# Patient Record
Sex: FEMALE | Race: WHITE | NOT HISPANIC OR LATINO | Employment: OTHER
[De-identification: names, ages, dates, MRNs, and addresses within clinical notes are randomized per-mention and may not be internally consistent; named-entity substitution may affect disease eponyms.]

---

## 2020-06-23 ENCOUNTER — OFFICE VISIT (OUTPATIENT)
Age: 70
End: 2020-06-23

## 2020-06-24 ENCOUNTER — TELEPHONE ENCOUNTER (OUTPATIENT)
Dept: URBAN - METROPOLITAN AREA CLINIC 9 | Facility: CLINIC | Age: 70
End: 2020-06-24

## 2020-07-06 ENCOUNTER — TELEPHONE ENCOUNTER (OUTPATIENT)
Dept: URBAN - METROPOLITAN AREA CLINIC 9 | Facility: CLINIC | Age: 70
End: 2020-07-06

## 2020-07-09 ENCOUNTER — OFFICE VISIT (OUTPATIENT)
Dept: URBAN - METROPOLITAN AREA CLINIC 7 | Facility: CLINIC | Age: 70
End: 2020-07-09

## 2020-07-16 ENCOUNTER — OFFICE VISIT (OUTPATIENT)
Dept: URBAN - METROPOLITAN AREA CLINIC 7 | Facility: CLINIC | Age: 70
End: 2020-07-16

## 2020-07-16 ENCOUNTER — TELEPHONE ENCOUNTER (OUTPATIENT)
Dept: URBAN - METROPOLITAN AREA CLINIC 9 | Facility: CLINIC | Age: 70
End: 2020-07-16

## 2020-08-05 ENCOUNTER — OFFICE VISIT (OUTPATIENT)
Dept: URBAN - METROPOLITAN AREA CLINIC 7 | Facility: CLINIC | Age: 70
End: 2020-08-05

## 2020-09-18 ENCOUNTER — OFFICE VISIT (OUTPATIENT)
Dept: URBAN - METROPOLITAN AREA SURGERY CENTER 5 | Facility: SURGERY CENTER | Age: 70
End: 2020-09-18

## 2020-10-01 ENCOUNTER — OFFICE VISIT (OUTPATIENT)
Age: 70
End: 2020-10-01

## 2021-07-01 ENCOUNTER — OFFICE VISIT (OUTPATIENT)
Age: 71
End: 2021-07-01

## 2021-09-16 ENCOUNTER — OFFICE VISIT (OUTPATIENT)
Dept: URBAN - METROPOLITAN AREA CLINIC 7 | Facility: CLINIC | Age: 71
End: 2021-09-16

## 2021-09-24 LAB — CONCENTRATE (1): (no result)

## 2021-09-28 LAB
CLOSTRIDIUM DIFFICILE TOXIN/GDH W/REFL TO PCR: (no result)
CYCLOSPORA AND ISOSPORA EXAMINATION: (no result)
LACTOFERRIN, QL, STOOL: (no result)
OVA AND PARASITES, CONC AND PERM SMEAR: (no result)
PANCREATIC ELASTASE-1: (no result)

## 2021-10-04 ENCOUNTER — TELEPHONE ENCOUNTER (OUTPATIENT)
Dept: URBAN - METROPOLITAN AREA CLINIC 9 | Facility: CLINIC | Age: 71
End: 2021-10-04

## 2021-11-18 ENCOUNTER — TELEPHONE ENCOUNTER (OUTPATIENT)
Dept: URBAN - METROPOLITAN AREA CLINIC 9 | Facility: CLINIC | Age: 71
End: 2021-11-18

## 2021-12-23 ENCOUNTER — TELEPHONE ENCOUNTER (OUTPATIENT)
Dept: URBAN - METROPOLITAN AREA CLINIC 9 | Facility: CLINIC | Age: 71
End: 2021-12-23

## 2022-01-27 ENCOUNTER — TELEPHONE ENCOUNTER (OUTPATIENT)
Dept: URBAN - METROPOLITAN AREA CLINIC 9 | Facility: CLINIC | Age: 72
End: 2022-01-27

## 2022-01-31 ENCOUNTER — TELEPHONE ENCOUNTER (OUTPATIENT)
Dept: URBAN - METROPOLITAN AREA CLINIC 9 | Facility: CLINIC | Age: 72
End: 2022-01-31

## 2022-02-10 ENCOUNTER — OFFICE VISIT (OUTPATIENT)
Dept: URBAN - METROPOLITAN AREA SURGERY CENTER 5 | Facility: SURGERY CENTER | Age: 72
End: 2022-02-10

## 2022-02-22 ENCOUNTER — OFFICE VISIT (OUTPATIENT)
Dept: URBAN - METROPOLITAN AREA SURGERY CENTER 5 | Facility: SURGERY CENTER | Age: 72
End: 2022-02-22

## 2022-04-27 ENCOUNTER — OFFICE VISIT (OUTPATIENT)
Age: 72
End: 2022-04-27

## 2022-04-27 ENCOUNTER — TELEPHONE ENCOUNTER (OUTPATIENT)
Dept: URBAN - METROPOLITAN AREA CLINIC 9 | Facility: CLINIC | Age: 72
End: 2022-04-27

## 2022-05-23 ENCOUNTER — OFFICE VISIT (OUTPATIENT)
Dept: URBAN - METROPOLITAN AREA CLINIC 7 | Facility: CLINIC | Age: 72
End: 2022-05-23

## 2022-06-29 ENCOUNTER — OFFICE VISIT (OUTPATIENT)
Dept: URBAN - METROPOLITAN AREA SURGERY CENTER 5 | Facility: SURGERY CENTER | Age: 72
End: 2022-06-29

## 2022-07-07 ENCOUNTER — TELEPHONE ENCOUNTER (OUTPATIENT)
Dept: URBAN - METROPOLITAN AREA CLINIC 9 | Facility: CLINIC | Age: 72
End: 2022-07-07

## 2022-07-11 ENCOUNTER — TELEPHONE ENCOUNTER (OUTPATIENT)
Dept: URBAN - METROPOLITAN AREA CLINIC 9 | Facility: CLINIC | Age: 72
End: 2022-07-11

## 2022-07-13 ENCOUNTER — OFFICE VISIT (OUTPATIENT)
Dept: URBAN - METROPOLITAN AREA SURGERY CENTER 5 | Facility: SURGERY CENTER | Age: 72
End: 2022-07-13

## 2022-07-30 ENCOUNTER — TELEPHONE ENCOUNTER (OUTPATIENT)
Age: 72
End: 2022-07-30

## 2022-07-30 RX ORDER — SENNA-LAX 8.6 MG/1
1 (ONE) TABLET ORAL
Qty: 0 | Refills: 5 | OUTPATIENT
Start: 2015-09-25 | End: 2016-03-01

## 2022-07-30 RX ORDER — LORATADINE 5 MG
17 GRAMS TABLET,CHEWABLE ORAL
Qty: 0 | Refills: 16 | OUTPATIENT
Start: 2015-09-25 | End: 2016-03-01

## 2022-07-30 RX ORDER — FAMOTIDINE 10 MG
1 (ONE) TABLET ORAL QPM
Qty: 0 | Refills: 11 | OUTPATIENT
Start: 2019-06-17 | End: 2021-09-16

## 2022-07-31 ENCOUNTER — TELEPHONE ENCOUNTER (OUTPATIENT)
Age: 72
End: 2022-07-31

## 2022-07-31 RX ORDER — FAMOTIDINE 10 MG
1 (ONE) TABLET ORAL QPM
Qty: 0 | Refills: 11 | Status: ACTIVE | COMMUNITY
Start: 2018-06-19

## 2022-07-31 RX ORDER — SENNA-LAX 8.6 MG/1
1 (ONE) TABLET ORAL
Qty: 0 | Refills: 5 | Status: ACTIVE | COMMUNITY
Start: 2015-09-25

## 2022-07-31 RX ORDER — LORATADINE 5 MG
17 GRAMS TABLET,CHEWABLE ORAL
Qty: 0 | Refills: 16 | Status: ACTIVE | COMMUNITY
Start: 2015-09-25

## 2022-07-31 RX ORDER — FAMOTIDINE 10 MG
1 (ONE) TABLET ORAL QPM
Qty: 0 | Refills: 11 | Status: ACTIVE | COMMUNITY
Start: 2019-06-17

## 2022-09-20 ENCOUNTER — TELEPHONE ENCOUNTER (OUTPATIENT)
Dept: URBAN - METROPOLITAN AREA CLINIC 7 | Facility: CLINIC | Age: 72
End: 2022-09-20

## 2022-09-22 ENCOUNTER — CLAIMS CREATED FROM THE CLAIM WINDOW (OUTPATIENT)
Dept: URBAN - METROPOLITAN AREA CLINIC 8 | Facility: CLINIC | Age: 72
End: 2022-09-22
Payer: MEDICARE

## 2022-09-22 ENCOUNTER — TELEPHONE ENCOUNTER (OUTPATIENT)
Dept: URBAN - METROPOLITAN AREA CLINIC 7 | Facility: CLINIC | Age: 72
End: 2022-09-22

## 2022-09-22 ENCOUNTER — OFFICE VISIT (OUTPATIENT)
Dept: URBAN - METROPOLITAN AREA SURGERY CENTER 5 | Facility: SURGERY CENTER | Age: 72
End: 2022-09-22
Payer: MEDICARE

## 2022-09-22 DIAGNOSIS — R13.10 ABNORMAL DEGLUTITION: ICD-10-CM

## 2022-09-22 DIAGNOSIS — K21.00 ALKALINE REFLUX ESOPHAGITIS: ICD-10-CM

## 2022-09-22 DIAGNOSIS — K22.89 DILATATION OF ESOPHAGUS: ICD-10-CM

## 2022-09-22 DIAGNOSIS — Z87.19 ESOPHAGEAL FOOD BOLUS: ICD-10-CM

## 2022-09-22 PROCEDURE — 43248 EGD GUIDE WIRE INSERTION: CPT | Performed by: CLINIC/CENTER

## 2022-09-22 PROCEDURE — 88312 SPECIAL STAINS GROUP 1: CPT | Performed by: INTERNAL MEDICINE

## 2022-09-22 PROCEDURE — 43248 EGD GUIDE WIRE INSERTION: CPT | Performed by: INTERNAL MEDICINE

## 2022-09-22 PROCEDURE — 88305 TISSUE EXAM BY PATHOLOGIST: CPT | Performed by: INTERNAL MEDICINE

## 2022-09-22 RX ORDER — SENNA-LAX 8.6 MG/1
1 (ONE) TABLET ORAL
Qty: 0 | Refills: 5 | Status: ACTIVE | COMMUNITY
Start: 2015-09-25

## 2022-09-22 RX ORDER — LORATADINE 5 MG
17 GRAMS TABLET,CHEWABLE ORAL
Qty: 0 | Refills: 16 | Status: ACTIVE | COMMUNITY
Start: 2015-09-25

## 2022-09-22 RX ORDER — FAMOTIDINE 10 MG
1 (ONE) TABLET ORAL QPM
Qty: 0 | Refills: 11 | Status: ACTIVE | COMMUNITY
Start: 2019-06-17

## 2022-10-18 ENCOUNTER — OFFICE VISIT (OUTPATIENT)
Dept: URBAN - METROPOLITAN AREA CLINIC 7 | Facility: CLINIC | Age: 72
End: 2022-10-18
Payer: MEDICARE

## 2022-10-18 VITALS
WEIGHT: 149 LBS | HEIGHT: 60 IN | BODY MASS INDEX: 29.25 KG/M2 | DIASTOLIC BLOOD PRESSURE: 80 MMHG | RESPIRATION RATE: 16 BRPM | TEMPERATURE: 97.9 F | SYSTOLIC BLOOD PRESSURE: 130 MMHG

## 2022-10-18 DIAGNOSIS — K22.70 BARRETT'S ESOPHAGUS WITHOUT DYSPLASIA: ICD-10-CM

## 2022-10-18 DIAGNOSIS — Z87.39 HISTORY OF ARTHRITIS: ICD-10-CM

## 2022-10-18 DIAGNOSIS — Z86.010 PERSONAL HISTORY OF COLONIC POLYPS: ICD-10-CM

## 2022-10-18 DIAGNOSIS — C49.A2 GASTROINTESTINAL STROMAL TUMOR OF STOMACH: ICD-10-CM

## 2022-10-18 DIAGNOSIS — K21.9 GERD WITHOUT ESOPHAGITIS: ICD-10-CM

## 2022-10-18 PROBLEM — 235595009 GASTROESOPHAGEAL REFLUX DISEASE: Status: ACTIVE | Noted: 2022-10-17

## 2022-10-18 PROBLEM — 445736006 GASTROINTESTINAL STROMAL TUMOR OF STOMACH: Status: ACTIVE | Noted: 2022-10-17

## 2022-10-18 PROBLEM — 302914006 BARRETT'S ESOPHAGUS: Status: ACTIVE | Noted: 2022-10-17

## 2022-10-18 PROCEDURE — 99214 OFFICE O/P EST MOD 30 MIN: CPT | Performed by: INTERNAL MEDICINE

## 2022-10-18 RX ORDER — FAMOTIDINE 10 MG
1 (ONE) TABLET ORAL QPM
Qty: 0 | Refills: 11 | Status: DISCONTINUED | COMMUNITY
Start: 2019-06-17

## 2022-10-18 RX ORDER — HYDROCHLOROTHIAZIDE 25 MG/1
TAKE 1 TABLET BY MOUTH EVERY DAY FOR 30 DAYS TABLET ORAL
Qty: 90 EACH | Refills: 0 | Status: ACTIVE | COMMUNITY

## 2022-10-18 RX ORDER — TIZANIDINE 4 MG/1
TAKE 1 TABLET BY MOUTH THREE TIMES A DAY AS NEEDED FOR MUSCLE SPASMS TABLET ORAL
Qty: 90 EACH | Refills: 0 | Status: ACTIVE | COMMUNITY

## 2022-10-18 RX ORDER — LORATADINE 5 MG
17 GRAMS TABLET,CHEWABLE ORAL
Qty: 0 | Refills: 16 | Status: DISCONTINUED | COMMUNITY
Start: 2015-09-25

## 2022-10-18 RX ORDER — FAMOTIDINE 40 MG/1
1 TABLET AT BEDTIME TABLET, FILM COATED ORAL ONCE A DAY
Qty: 30 | Refills: 3 | OUTPATIENT
Start: 2022-10-18

## 2022-10-18 RX ORDER — OMEPRAZOLE 40 MG/1
CAPSULE, DELAYED RELEASE ORAL
Qty: 90 CAPSULE | Status: ACTIVE | COMMUNITY

## 2022-10-18 RX ORDER — AMLODIPINE BESYLATE 10 MG/1
TABLET ORAL
Qty: 90 TABLET | Status: ACTIVE | COMMUNITY

## 2022-10-18 RX ORDER — CITALOPRAM HYDROBROMIDE 40 MG/1
TABLET, FILM COATED ORAL
Qty: 90 TABLET | Status: ACTIVE | COMMUNITY

## 2022-10-18 RX ORDER — ATORVASTATIN CALCIUM 40 MG/1
TABLET, FILM COATED ORAL
Qty: 90 TABLET | Status: ACTIVE | COMMUNITY

## 2022-10-18 RX ORDER — OXYCODONE HYDROCHLORIDE 10 MG/1
TABLET ORAL
Qty: 60 TABLET | Status: ACTIVE | COMMUNITY

## 2022-10-18 RX ORDER — SENNA-LAX 8.6 MG/1
1 (ONE) TABLET ORAL
Qty: 0 | Refills: 5 | Status: DISCONTINUED | COMMUNITY
Start: 2015-09-25

## 2022-10-18 NOTE — HPI-TODAY'S VISIT:
Patient was last seen in the office in May 2022 and is new to me.  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  I did her upper endoscopy, EGD, September 2022 which demonstrated a variable Z-line otherwise normal esophagus which was dilated to 51 Hebrew with a savary dilator, postsurgical changes in the gastric greater curvature healthy-appearing, and otherwise normal stomach with biopsies negative for Syed's and EOE.  She was continued on PPI and surveillance was recommended in 3 years.  Her last colonoscopy was in May 2017 with 1 hyperplastic polyp removed, internal hemorrhoids, diverticulosis.  In 2011 she had several hyperplastic polyps removed from the sigmoid colon as well and was otherwise normal.  She had a CT scan of the chest without contrast in November 2017 which demonstrated no evidence of suspicious masses or pathologic lymphadenopathy and no pulmonary disease.  She did have a CT scan of the abdomen pelvis which demonstrated no primary or metastatic disease in May 2018, and 3 adrenal nodules suggestive of adrenal adenomas.  She also does have a history of splenic infarcts back in 2011.  Pancreatic elastase in the past was 406.  C. difficile in 2021 was negative.  Her GIST tumor was removed in 2007.  She did have a tubular adenoma back in 2000. No fam hx of CRC. She is doing well at this point. She is having reflux issues, heartburn, on PPI in the AM. No weight loss.

## 2022-11-27 ENCOUNTER — ERX REFILL RESPONSE (OUTPATIENT)
Dept: URBAN - METROPOLITAN AREA CLINIC 7 | Facility: CLINIC | Age: 72
End: 2022-11-27

## 2022-11-27 RX ORDER — FAMOTIDINE 40 MG/1
1 TABLET AT BEDTIME TABLET, FILM COATED ORAL ONCE A DAY
Qty: 30 | Refills: 3 | OUTPATIENT

## 2022-11-27 RX ORDER — FAMOTIDINE 40 MG/1
TAKE 1 TABLET BY MOUTH EVERY DAY AT BEDTIME FOR 30 DAYS TABLET, FILM COATED ORAL
Qty: 90 TABLET | Refills: 3 | OUTPATIENT

## 2023-09-11 ENCOUNTER — OFFICE VISIT (OUTPATIENT)
Dept: URBAN - METROPOLITAN AREA CLINIC 7 | Facility: CLINIC | Age: 73
End: 2023-09-11
Payer: MEDICARE

## 2023-09-11 ENCOUNTER — DASHBOARD ENCOUNTERS (OUTPATIENT)
Age: 73
End: 2023-09-11

## 2023-09-11 ENCOUNTER — LAB OUTSIDE AN ENCOUNTER (OUTPATIENT)
Dept: URBAN - METROPOLITAN AREA CLINIC 7 | Facility: CLINIC | Age: 73
End: 2023-09-11

## 2023-09-11 VITALS
HEIGHT: 60 IN | TEMPERATURE: 97.4 F | DIASTOLIC BLOOD PRESSURE: 78 MMHG | BODY MASS INDEX: 31.41 KG/M2 | SYSTOLIC BLOOD PRESSURE: 152 MMHG | WEIGHT: 160 LBS | RESPIRATION RATE: 16 BRPM

## 2023-09-11 DIAGNOSIS — R19.7 DIARRHEA, UNSPECIFIED TYPE: ICD-10-CM

## 2023-09-11 DIAGNOSIS — K21.9 GERD WITHOUT ESOPHAGITIS: ICD-10-CM

## 2023-09-11 DIAGNOSIS — C49.A2 GASTROINTESTINAL STROMAL TUMOR OF STOMACH: ICD-10-CM

## 2023-09-11 DIAGNOSIS — Z87.39 HISTORY OF ARTHRITIS: ICD-10-CM

## 2023-09-11 DIAGNOSIS — K22.70 BARRETT'S ESOPHAGUS WITHOUT DYSPLASIA: ICD-10-CM

## 2023-09-11 DIAGNOSIS — Z86.010 PERSONAL HISTORY OF COLONIC POLYPS: ICD-10-CM

## 2023-09-11 PROCEDURE — 99214 OFFICE O/P EST MOD 30 MIN: CPT | Performed by: INTERNAL MEDICINE

## 2023-09-11 RX ORDER — SOD SULF/POT CHLORIDE/MAG SULF 1.479 G
12 TABLETS THE FIRST DOSE THE EVENING BEFORE AND SECOND DOSE THE MORNING OF COLONOSCOPY TABLET ORAL TWICE A DAY
Qty: 24 | OUTPATIENT
Start: 2023-09-11 | End: 2023-09-12

## 2023-09-11 RX ORDER — OXYCODONE HYDROCHLORIDE 10 MG/1
TABLET ORAL
Qty: 60 TABLET | Status: DISCONTINUED | COMMUNITY

## 2023-09-11 RX ORDER — HYDROCHLOROTHIAZIDE 25 MG/1
TAKE 1 TABLET BY MOUTH EVERY DAY FOR 30 DAYS TABLET ORAL
Qty: 90 EACH | Refills: 0 | Status: ACTIVE | COMMUNITY

## 2023-09-11 RX ORDER — OMEPRAZOLE 40 MG/1
CAPSULE, DELAYED RELEASE ORAL
Qty: 90 CAPSULE | Status: ACTIVE | COMMUNITY

## 2023-09-11 RX ORDER — TIZANIDINE 4 MG/1
TAKE 1 TABLET BY MOUTH THREE TIMES A DAY AS NEEDED FOR MUSCLE SPASMS TABLET ORAL
Qty: 90 EACH | Refills: 0 | Status: ACTIVE | COMMUNITY

## 2023-09-11 RX ORDER — FAMOTIDINE 40 MG/1
TAKE 1 TABLET BY MOUTH EVERY DAY AT BEDTIME FOR 30 DAYS TABLET, FILM COATED ORAL
Qty: 90 TABLET | Refills: 3 | Status: DISCONTINUED | COMMUNITY

## 2023-09-11 RX ORDER — AMLODIPINE BESYLATE 10 MG/1
TABLET ORAL
Qty: 90 TABLET | Status: ACTIVE | COMMUNITY

## 2023-09-11 RX ORDER — CITALOPRAM HYDROBROMIDE 40 MG/1
TABLET, FILM COATED ORAL
Qty: 90 TABLET | Status: ACTIVE | COMMUNITY

## 2023-09-11 RX ORDER — ATORVASTATIN CALCIUM 40 MG/1
TABLET, FILM COATED ORAL
Qty: 90 TABLET | Status: ACTIVE | COMMUNITY

## 2023-09-11 NOTE — HPI-TODAY'S VISIT:
LV 10/2022. 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 EGD, September 2022 which demonstrated a variable Z-line otherwise normal esophagus which was dilated to 51 [place]ymra[place] with a savary dilator, postsurgical changes in the gastric greater curvature healthy-appearing, and otherwise normal stomach with biopsies negative for Syed's and EOE.  She was continued on PPI and surveillance was recommended in 3 years.  Her last colonoscopy was in May 2017 with 1 hyperplastic polyp removed, internal hemorrhoids, diverticulosis.  In 2011 she had several hyperplastic polyps removed from the sigmoid colon as well and was otherwise normal.  She had a CT scan of the chest without contrast in November 2017 which demonstrated no evidence of suspicious masses or pathologic lymphadenopathy and no pulmonary disease.  She did have a CT scan of the abdomen pelvis which demonstrated no primary or metastatic disease in May 2018, and 3 adrenal nodules suggestive of adrenal adenomas.  She also does have a history of splenic infarcts back in 2011.  Pancreatic elastase in the past was 406.  C. difficile in 2021 was negative.  Her GIST tumor was removed in 2007.  She did have a tubular adenoma back in 2000. No fam hx of CRC. LV, she was having reflux issues, heartburn, on PPI in the AM. No weight loss. Plan was EGD in 3 years (2025) for Syed's and colon in 2 years (2024) for follow up (she is lower risk, no TA since 2000).  She had uterine cancer, s/p hysterectomy, and ovaries as well. She is having some lower pelvic cramping at times, sometimes harder stools. Her last colon was in May 2017. No bleeding, some changes in bowel habits. No weight loss.

## 2023-09-18 ENCOUNTER — LAB OUTSIDE AN ENCOUNTER (OUTPATIENT)
Dept: URBAN - METROPOLITAN AREA CLINIC 7 | Facility: CLINIC | Age: 73
End: 2023-09-18

## 2023-10-05 ENCOUNTER — OFFICE VISIT (OUTPATIENT)
Dept: URBAN - METROPOLITAN AREA SURGERY CENTER 5 | Facility: SURGERY CENTER | Age: 73
End: 2023-10-05

## 2023-10-19 ENCOUNTER — TELEPHONE ENCOUNTER (OUTPATIENT)
Dept: URBAN - METROPOLITAN AREA CLINIC 7 | Facility: CLINIC | Age: 73
End: 2023-10-19

## 2023-11-10 ENCOUNTER — CLAIMS CREATED FROM THE CLAIM WINDOW (OUTPATIENT)
Dept: URBAN - METROPOLITAN AREA CLINIC 4 | Facility: CLINIC | Age: 73
End: 2023-11-10
Payer: MEDICARE

## 2023-11-10 ENCOUNTER — OFFICE VISIT (OUTPATIENT)
Dept: URBAN - METROPOLITAN AREA SURGERY CENTER 5 | Facility: SURGERY CENTER | Age: 73
End: 2023-11-10
Payer: MEDICARE

## 2023-11-10 DIAGNOSIS — K63.5 POLYP OF ASCENDING COLON, UNSPECIFIED TYPE: ICD-10-CM

## 2023-11-10 DIAGNOSIS — K62.1 RECTAL POLYP: ICD-10-CM

## 2023-11-10 DIAGNOSIS — K57.30 DIVERTICULOSIS OF LARGE INTESTINE WITHOUT PERFORATION OR ABSCESS WITHOUT BLEEDING: ICD-10-CM

## 2023-11-10 DIAGNOSIS — D12.3 POLYP OF TRANSVERSE COLON: ICD-10-CM

## 2023-11-10 DIAGNOSIS — D12.3 BENIGN NEOPLASM OF TRANSVERSE COLON: ICD-10-CM

## 2023-11-10 DIAGNOSIS — K64.8 OTHER HEMORRHOIDS: ICD-10-CM

## 2023-11-10 DIAGNOSIS — Z86.010 HISTORY OF ADENOMATOUS POLYP OF COLON: ICD-10-CM

## 2023-11-10 DIAGNOSIS — Z86.010 PERSONAL HISTORY OF COLONIC POLYPS: ICD-10-CM

## 2023-11-10 DIAGNOSIS — Z12.11 ENCOUNTER FOR SCREENING FOR MALIGNANT NEOPLASM OF COLON: ICD-10-CM

## 2023-11-10 DIAGNOSIS — D12.2 POLYP OF ASCENDING COLON: ICD-10-CM

## 2023-11-10 DIAGNOSIS — K63.89 OTHER SPECIFIED DISEASES OF INTESTINE: ICD-10-CM

## 2023-11-10 DIAGNOSIS — D12.4 POLYP OF DESCENDING COLON: ICD-10-CM

## 2023-11-10 PROCEDURE — 45380 COLONOSCOPY AND BIOPSY: CPT | Performed by: CLINIC/CENTER

## 2023-11-10 PROCEDURE — 00811 ANES LWR INTST NDSC NOS: CPT | Performed by: NURSE ANESTHETIST, CERTIFIED REGISTERED

## 2023-11-10 PROCEDURE — 88305 TISSUE EXAM BY PATHOLOGIST: CPT | Performed by: PATHOLOGY

## 2023-11-10 PROCEDURE — 45385 COLONOSCOPY W/LESION REMOVAL: CPT | Performed by: CLINIC/CENTER

## 2023-11-10 PROCEDURE — 45380 COLONOSCOPY AND BIOPSY: CPT | Performed by: INTERNAL MEDICINE

## 2023-11-10 PROCEDURE — 45385 COLONOSCOPY W/LESION REMOVAL: CPT | Performed by: INTERNAL MEDICINE

## 2023-11-10 PROCEDURE — 88302 TISSUE EXAM BY PATHOLOGIST: CPT | Performed by: PATHOLOGY

## 2023-11-10 RX ORDER — AMLODIPINE BESYLATE 10 MG/1
TABLET ORAL
Qty: 90 TABLET | Status: ACTIVE | COMMUNITY

## 2023-11-10 RX ORDER — CITALOPRAM HYDROBROMIDE 40 MG/1
TABLET, FILM COATED ORAL
Qty: 90 TABLET | Status: ACTIVE | COMMUNITY

## 2023-11-10 RX ORDER — TIZANIDINE 4 MG/1
TAKE 1 TABLET BY MOUTH THREE TIMES A DAY AS NEEDED FOR MUSCLE SPASMS TABLET ORAL
Qty: 90 EACH | Refills: 0 | Status: ACTIVE | COMMUNITY

## 2023-11-10 RX ORDER — HYDROCHLOROTHIAZIDE 25 MG/1
TAKE 1 TABLET BY MOUTH EVERY DAY FOR 30 DAYS TABLET ORAL
Qty: 90 EACH | Refills: 0 | Status: ACTIVE | COMMUNITY

## 2023-11-10 RX ORDER — OMEPRAZOLE 40 MG/1
CAPSULE, DELAYED RELEASE ORAL
Qty: 90 CAPSULE | Status: ACTIVE | COMMUNITY

## 2023-11-10 RX ORDER — ATORVASTATIN CALCIUM 40 MG/1
TABLET, FILM COATED ORAL
Qty: 90 TABLET | Status: ACTIVE | COMMUNITY

## 2024-05-01 ENCOUNTER — LAB OUTSIDE AN ENCOUNTER (OUTPATIENT)
Dept: URBAN - METROPOLITAN AREA CLINIC 7 | Facility: CLINIC | Age: 74
End: 2024-05-01

## 2024-11-11 ENCOUNTER — TELEPHONE ENCOUNTER (OUTPATIENT)
Dept: URBAN - METROPOLITAN AREA CLINIC 8 | Facility: CLINIC | Age: 74
End: 2024-11-11

## 2025-02-25 ENCOUNTER — LAB OUTSIDE AN ENCOUNTER (OUTPATIENT)
Dept: URBAN - METROPOLITAN AREA CLINIC 7 | Facility: CLINIC | Age: 75
End: 2025-02-25

## 2025-02-25 ENCOUNTER — OFFICE VISIT (OUTPATIENT)
Dept: URBAN - METROPOLITAN AREA CLINIC 7 | Facility: CLINIC | Age: 75
End: 2025-02-25
Payer: MEDICARE

## 2025-02-25 VITALS
WEIGHT: 153 LBS | SYSTOLIC BLOOD PRESSURE: 130 MMHG | HEIGHT: 60 IN | HEART RATE: 80 BPM | BODY MASS INDEX: 30.04 KG/M2 | TEMPERATURE: 97.6 F | RESPIRATION RATE: 15 BRPM | DIASTOLIC BLOOD PRESSURE: 80 MMHG

## 2025-02-25 DIAGNOSIS — R19.7 DIARRHEA, UNSPECIFIED TYPE: ICD-10-CM

## 2025-02-25 DIAGNOSIS — Z87.39 HISTORY OF ARTHRITIS: ICD-10-CM

## 2025-02-25 DIAGNOSIS — K86.89 DILATED PANCREATIC DUCT: ICD-10-CM

## 2025-02-25 DIAGNOSIS — Z86.0100 PERSONAL HISTORY OF COLONIC POLYPS: ICD-10-CM

## 2025-02-25 DIAGNOSIS — K21.9 GERD WITHOUT ESOPHAGITIS: ICD-10-CM

## 2025-02-25 DIAGNOSIS — C49.A2 GASTROINTESTINAL STROMAL TUMOR OF STOMACH: ICD-10-CM

## 2025-02-25 DIAGNOSIS — K22.70 BARRETT'S ESOPHAGUS WITHOUT DYSPLASIA: ICD-10-CM

## 2025-02-25 DIAGNOSIS — K86.2 PANCREATIC CYST: ICD-10-CM

## 2025-02-25 PROCEDURE — 99214 OFFICE O/P EST MOD 30 MIN: CPT | Performed by: INTERNAL MEDICINE

## 2025-02-25 RX ORDER — NIFEDIPINE 30 MG/1
1 TABLET ON AN EMPTY STOMACH TABLET, EXTENDED RELEASE ORAL TWICE A DAY
Status: ACTIVE | COMMUNITY

## 2025-02-25 RX ORDER — TIZANIDINE 4 MG/1
TAKE 1 TABLET BY MOUTH THREE TIMES A DAY AS NEEDED FOR MUSCLE SPASMS TABLET ORAL
Qty: 90 EACH | Refills: 0 | Status: DISCONTINUED | COMMUNITY

## 2025-02-25 RX ORDER — ATORVASTATIN CALCIUM 40 MG/1
TABLET, FILM COATED ORAL
Qty: 90 TABLET | Status: ACTIVE | COMMUNITY

## 2025-02-25 RX ORDER — OMEPRAZOLE 40 MG/1
CAPSULE, DELAYED RELEASE ORAL
Qty: 90 CAPSULE | Status: ACTIVE | COMMUNITY

## 2025-02-25 RX ORDER — AMLODIPINE BESYLATE 10 MG/1
TABLET ORAL
Qty: 90 TABLET | Status: DISCONTINUED | COMMUNITY

## 2025-02-25 RX ORDER — APIXABAN 5 MG/1
AS DIRECTED TABLET, FILM COATED ORAL
Status: ACTIVE | COMMUNITY

## 2025-02-25 RX ORDER — HYDROCHLOROTHIAZIDE 25 MG/1
TAKE 1 TABLET BY MOUTH EVERY DAY FOR 30 DAYS TABLET ORAL
Qty: 90 EACH | Refills: 0 | Status: DISCONTINUED | COMMUNITY

## 2025-02-25 RX ORDER — CITALOPRAM HYDROBROMIDE 40 MG/1
TABLET, FILM COATED ORAL
Qty: 90 TABLET | Status: ACTIVE | COMMUNITY

## 2025-02-25 NOTE — HPI-TODAY'S VISIT:
LV 9/2023. 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 EGD, September 2022 which demonstrated a variable Z-line otherwise normal esophagus which was dilated to 51 Nepali with a savary dilator, postsurgical changes in the gastric greater curvature healthy-appearing, and otherwise normal stomach with biopsies negative for Syed's and EOE.  She was continued on PPI and surveillance was recommended in 3 years.  Her last colonoscopy was in May 2017 with 1 hyperplastic polyp removed, internal hemorrhoids, diverticulosis.  In 2011 she had several hyperplastic polyps removed from the sigmoid colon as well and was otherwise normal.  She had a CT scan of the chest without contrast in November 2017 which demonstrated no evidence of suspicious masses or pathologic lymphadenopathy and no pulmonary disease.  She did have a CT scan of the abdomen pelvis which demonstrated no primary or metastatic disease in May 2018, and 3 adrenal nodules suggestive of adrenal adenomas.  She also does have a history of splenic infarcts back in 2011.  Pancreatic elastase in the past was 406.  C. difficile in 2021 was negative.  Her GIST tumor was removed in 2007.  She did have a tubular adenoma back in 2000. No fam hx of CRC. LV, she was having reflux issues, heartburn, on PPI in the AM. No weight loss. Plan was EGD in 3 years (2025) for Syed's and colon in 2 years (2024) for follow up (she is lower risk, no TA since 2000). She had uterine cancer, s/p hysterectomy, and ovaries as well. She is having some lower pelvic cramping at times, sometimes harder stools. Her last colon was in May 2017. No bleeding, some changes in bowel habits. No weight loss. Plan was for surveillance colonoscopy with SUTAB prep (does have looser stools at times, so will do MC biopsies). May need to institute bowel regimen (benefiber 2 teaspoons once daily, 100 mg docusate (Colace) twice daily. Barium swallow for dysphagia. Colonoscopy November 2023 demonstrated perianal skin tags, 5 polyps including a 10 mm polyp in the ascending colon, diverticulosis, small nonbleeding internal hemorrhoids with otherwise normal exam and pathology demonstrating multiple tubular adenomas sessile serrated adenomas and hyperplastic polyps and otherwise negative for microscopic colitis.  Does not appear that her barium swallow was ever done.  She is now being seen for recent hospitalization December 2024 for pancreatitis.  Labs in December 2024 demonstrated a hemoglobin of 12.5, creatinine 0.92, normal LFTs.  Echo demonstrated an EF of 59% with no significant valve dysfunction.  CTA of the abdomen pelvis was done December 24 which demonstrated that her mesenteric arteries were patent.  However, there were numerous punctate enhancing lesions throughout the liver which were new compared to November 2022 and some features of chronic liver disease including a nodular contour, normal gallbladder, focal narrowing of the body the pancreas with distal pancreatic duct dilation measuring up to 9 mm with cystic changes of the pancreatic head measuring up to 1 cm.  All these features are new compared to CT imaging in November 2022.  No evidence of bowel obstruction or inflammation.  No lymphadenopathy.  MRI of the pancreas was done in the hospital which demonstrated new pancreatic duct dilation to 7 mm and multiple subcentimeter simple cysts in the pancreas which may resent multiple small IPMN's with recommendation for surgery on follow-up as an outpatient.  Also had a CT of the chest with a send aorta measuring 3.8 cm.  Anticoagulation was initiated at that time.  She was also seen by GI for pancreatic duct dilation and a follow-up for an EUS as an outpatient was recommended.  She was started on Eliquis.  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 No actual clot was seen on her imaging. She has seen hematology. She has seen vascular as well (Dr. Gustafson). She will be getting doppler studies with vascular surgery. No particular GI complaints other than dysphagia. Does improve with dilation.

## 2025-06-01 ENCOUNTER — LAB OUTSIDE AN ENCOUNTER (OUTPATIENT)
Dept: URBAN - METROPOLITAN AREA CLINIC 7 | Facility: CLINIC | Age: 75
End: 2025-06-01

## 2025-06-05 ENCOUNTER — CLAIMS CREATED FROM THE CLAIM WINDOW (OUTPATIENT)
Dept: URBAN - METROPOLITAN AREA SURGERY CENTER 5 | Facility: SURGERY CENTER | Age: 75
End: 2025-06-05
Payer: MEDICARE

## 2025-06-05 ENCOUNTER — CLAIMS CREATED FROM THE CLAIM WINDOW (OUTPATIENT)
Dept: URBAN - METROPOLITAN AREA CLINIC 4 | Facility: CLINIC | Age: 75
End: 2025-06-05
Payer: MEDICARE

## 2025-06-05 DIAGNOSIS — K22.89 OTHER SPECIFIED DISEASE OF ESOPHAGUS: ICD-10-CM

## 2025-06-05 DIAGNOSIS — Z87.19 HISTORY OF BARRETT'S ESOPHAGUS: ICD-10-CM

## 2025-06-05 DIAGNOSIS — R13.10 DYSPHAGIA, UNSPECIFIED TYPE: ICD-10-CM

## 2025-06-05 PROCEDURE — 00731 ANES UPR GI NDSC PX NOS: CPT | Performed by: NURSE ANESTHETIST, CERTIFIED REGISTERED

## 2025-06-05 PROCEDURE — 43248 EGD GUIDE WIRE INSERTION: CPT | Performed by: INTERNAL MEDICINE

## 2025-06-05 PROCEDURE — 88305 TISSUE EXAM BY PATHOLOGIST: CPT | Performed by: PATHOLOGY

## 2025-06-10 ENCOUNTER — TELEPHONE ENCOUNTER (OUTPATIENT)
Dept: URBAN - METROPOLITAN AREA CLINIC 7 | Facility: CLINIC | Age: 75
End: 2025-06-10

## 2025-06-19 ENCOUNTER — CLAIMS CREATED FROM THE CLAIM WINDOW (OUTPATIENT)
Dept: URBAN - METROPOLITAN AREA SURGERY CENTER 9 | Facility: SURGERY CENTER | Age: 75
End: 2025-06-19
Payer: MEDICARE

## 2025-06-19 ENCOUNTER — TELEPHONE ENCOUNTER (OUTPATIENT)
Dept: URBAN - METROPOLITAN AREA CLINIC 9 | Facility: CLINIC | Age: 75
End: 2025-06-19

## 2025-06-19 DIAGNOSIS — K57.10 DUODENAL DIVERTICULUM: ICD-10-CM

## 2025-06-19 DIAGNOSIS — K29.70 GASTRITIS WITHOUT BLEEDING, UNSPECIFIED CHRONICITY, UNSPECIFIED GASTRITIS TYPE: ICD-10-CM

## 2025-06-19 DIAGNOSIS — K29.70 GASTRITIS: ICD-10-CM

## 2025-06-19 DIAGNOSIS — K86.2 CYST OF PANCREAS: ICD-10-CM

## 2025-06-19 DIAGNOSIS — K86.2 PANCREATIC CYST: ICD-10-CM

## 2025-06-19 PROCEDURE — 00731 ANES UPR GI NDSC PX NOS: CPT | Performed by: NURSE ANESTHETIST, CERTIFIED REGISTERED

## 2025-06-19 PROCEDURE — 43242 EGD US FINE NEEDLE BX/ASPIR: CPT | Performed by: INTERNAL MEDICINE

## 2025-06-19 PROCEDURE — 43239 EGD BIOPSY SINGLE/MULTIPLE: CPT | Performed by: INTERNAL MEDICINE

## 2025-06-19 RX ORDER — CIPROFLOXACIN 500 MG/1
1 TABLET TABLET, FILM COATED ORAL
Qty: 6 | OUTPATIENT
Start: 2025-06-19 | End: 2025-06-22

## 2025-06-19 RX ORDER — OMEPRAZOLE 40 MG/1
CAPSULE, DELAYED RELEASE ORAL
Qty: 90 CAPSULE | Status: ACTIVE | COMMUNITY

## 2025-06-19 RX ORDER — APIXABAN 5 MG/1
AS DIRECTED TABLET, FILM COATED ORAL
Status: ACTIVE | COMMUNITY

## 2025-06-19 RX ORDER — NIFEDIPINE 30 MG/1
1 TABLET ON AN EMPTY STOMACH TABLET, EXTENDED RELEASE ORAL TWICE A DAY
Status: ACTIVE | COMMUNITY

## 2025-06-19 RX ORDER — ATORVASTATIN CALCIUM 40 MG/1
TABLET, FILM COATED ORAL
Qty: 90 TABLET | Status: ACTIVE | COMMUNITY

## 2025-06-19 RX ORDER — CITALOPRAM HYDROBROMIDE 40 MG/1
TABLET, FILM COATED ORAL
Qty: 90 TABLET | Status: ACTIVE | COMMUNITY

## 2025-06-23 ENCOUNTER — TELEPHONE ENCOUNTER (OUTPATIENT)
Dept: URBAN - METROPOLITAN AREA SURGERY CENTER 9 | Facility: SURGERY CENTER | Age: 75
End: 2025-06-23

## 2025-06-30 ENCOUNTER — TELEPHONE ENCOUNTER (OUTPATIENT)
Dept: URBAN - METROPOLITAN AREA CLINIC 9 | Facility: CLINIC | Age: 75
End: 2025-06-30

## 2025-08-28 ENCOUNTER — LAB OUTSIDE AN ENCOUNTER (OUTPATIENT)
Dept: URBAN - METROPOLITAN AREA CLINIC 7 | Facility: CLINIC | Age: 75
End: 2025-08-28

## 2025-08-28 ENCOUNTER — OFFICE VISIT (OUTPATIENT)
Dept: URBAN - METROPOLITAN AREA CLINIC 7 | Facility: CLINIC | Age: 75
End: 2025-08-28
Payer: MEDICARE

## 2025-08-28 DIAGNOSIS — C49.A2 GASTROINTESTINAL STROMAL TUMOR OF STOMACH: ICD-10-CM

## 2025-08-28 DIAGNOSIS — Z87.39 HISTORY OF ARTHRITIS: ICD-10-CM

## 2025-08-28 DIAGNOSIS — K21.9 GERD WITHOUT ESOPHAGITIS: ICD-10-CM

## 2025-08-28 DIAGNOSIS — K22.70 BARRETT'S ESOPHAGUS WITHOUT DYSPLASIA: ICD-10-CM

## 2025-08-28 DIAGNOSIS — K86.89 DILATED PANCREATIC DUCT: ICD-10-CM

## 2025-08-28 DIAGNOSIS — R19.7 DIARRHEA, UNSPECIFIED TYPE: ICD-10-CM

## 2025-08-28 DIAGNOSIS — K86.2 PANCREATIC CYST: ICD-10-CM

## 2025-08-28 DIAGNOSIS — Z86.0100 PERSONAL HISTORY OF COLONIC POLYPS: ICD-10-CM

## 2025-08-28 PROCEDURE — 99214 OFFICE O/P EST MOD 30 MIN: CPT | Performed by: INTERNAL MEDICINE

## 2025-08-28 RX ORDER — OMEPRAZOLE 40 MG/1
CAPSULE, DELAYED RELEASE ORAL
Qty: 90 CAPSULE | Status: ACTIVE | COMMUNITY

## 2025-08-28 RX ORDER — APIXABAN 5 MG/1
AS DIRECTED TABLET, FILM COATED ORAL
Status: DISCONTINUED | COMMUNITY

## 2025-08-28 RX ORDER — CLOPIDOGREL 75 MG/1
1 TABLET TABLET, FILM COATED ORAL ONCE A DAY
Status: ACTIVE | COMMUNITY

## 2025-08-28 RX ORDER — NIFEDIPINE 30 MG/1
1 TABLET ON AN EMPTY STOMACH TABLET, EXTENDED RELEASE ORAL TWICE A DAY
Status: ACTIVE | COMMUNITY

## 2025-08-28 RX ORDER — CITALOPRAM HYDROBROMIDE 40 MG/1
TABLET, FILM COATED ORAL
Qty: 90 TABLET | Status: ACTIVE | COMMUNITY

## 2025-08-28 RX ORDER — TIZANIDINE 4 MG/1
TABLET ORAL
Qty: 90 TABLET | Status: ACTIVE | COMMUNITY

## 2025-08-28 RX ORDER — OXYCODONE HYDROCHLORIDE 10 MG/1
TABLET ORAL
Qty: 60 TABLET | Status: ACTIVE | COMMUNITY

## 2025-08-28 RX ORDER — ATORVASTATIN CALCIUM 40 MG/1
TABLET, FILM COATED ORAL
Qty: 90 TABLET | Status: ACTIVE | COMMUNITY

## 2025-08-28 RX ORDER — CIPROFLOXACIN HYDROCHLORIDE 500 MG/1
TABLET, FILM COATED ORAL
Qty: 6 TABLET | Status: ACTIVE | COMMUNITY